# Patient Record
Sex: MALE | Race: WHITE | NOT HISPANIC OR LATINO | URBAN - NONMETROPOLITAN AREA
[De-identification: names, ages, dates, MRNs, and addresses within clinical notes are randomized per-mention and may not be internally consistent; named-entity substitution may affect disease eponyms.]

---

## 2020-08-11 ENCOUNTER — IMPORTED ENCOUNTER (OUTPATIENT)
Dept: URBAN - NONMETROPOLITAN AREA CLINIC 1 | Facility: CLINIC | Age: 70
End: 2020-08-11

## 2020-08-11 PROBLEM — H25.13: Noted: 2020-08-11

## 2020-08-11 PROCEDURE — 92004 COMPRE OPH EXAM NEW PT 1/>: CPT

## 2020-08-11 PROCEDURE — 92015 DETERMINE REFRACTIVE STATE: CPT

## 2020-08-11 NOTE — PATIENT DISCUSSION
Cataract(s)-Visually significant.-Cataract(s) causing symptomatic impairment of visual function not correctable with a tolerable change in glasses or contact lenses lighting or non-operative means resulting in specific activity limitations and/or participation restrictions including but not limited to reading viewing television driving or meeting vocational or recreational needs. -Expectation is clearer vision and reduced glare disability after cataract removal.-Refer to Dr Margie Laguerre for cataract evaluationpt to call when readys/p lasik oumonitor

## 2020-12-11 ENCOUNTER — IMPORTED ENCOUNTER (OUTPATIENT)
Dept: URBAN - NONMETROPOLITAN AREA CLINIC 1 | Facility: CLINIC | Age: 70
End: 2020-12-11

## 2020-12-11 PROCEDURE — 92014 COMPRE OPH EXAM EST PT 1/>: CPT

## 2020-12-11 NOTE — PATIENT DISCUSSION
Cataract(s)-Visually significant cataract OU .-Cataract(s) causing symptomatic impairment of visual function not correctable with a tolerable change in glasses or contact lenses lighting or non-operative means resulting in specific activity limitations and/or participation restrictions including but not limited to reading viewing television driving or meeting vocational or recreational needs. -Expectation is clearer vision and functional improvement in symptoms as well as reduced glare disability after cataract removal.-Order IOLMaster and OPD today. -Recommend LenSx/ LRI   based on today's OPD testing and lifestyle questionnaire.-All questions were answered regarding surgery including pre and post-op medications appointments activity restrictions and anesthetic usage.-The risks benefits and alternatives and special risk factors for the patient were discussed in detail including but not limited to: bleeding infection retinal detachment vitreous loss problems with the implant and possible need for additional surgery.-Although rare the possibility of complete vision loss was discussed.-The possible need for glasses post-operatively was discussed.-Order medical clearance exam based on history of HBP and heart disease -Patient elects to proceed with cataract surgery OS . Will schedule at patient's convenience and re-evaluate OD  in the future. Doctors Recommendations Hx of Hyperopic Lasik OU discussed this with patient and the irregular astigmatism he now has as a result and realistic vision outcome with guarded prognosis. explained to patient the reason for all testing  and our process to ensure he has the best possible vision outcome. patient is a perfectionist and expects to have perfect vision but stressed to him i can not guarantee him perfect vision. Patient refused pre  before seeing me today. Possible need for mild rx or otc readers was explained. Discussed LenSx vs Trad with patient elects to proceed with LenSx. Post op inflammation anticipated discussed Dextenza insertion after surgery.

## 2020-12-30 PROBLEM — H25.13: Noted: 2020-12-30

## 2021-01-06 ENCOUNTER — IMPORTED ENCOUNTER (OUTPATIENT)
Dept: URBAN - NONMETROPOLITAN AREA CLINIC 1 | Facility: CLINIC | Age: 71
End: 2021-01-06

## 2021-01-06 PROBLEM — I10: Noted: 2021-01-06

## 2021-01-06 PROBLEM — J44.9: Noted: 2021-01-06

## 2021-01-06 PROBLEM — H25.13: Noted: 2021-01-06

## 2021-01-06 PROBLEM — Z01.818: Noted: 2021-01-06

## 2021-01-29 ENCOUNTER — IMPORTED ENCOUNTER (OUTPATIENT)
Dept: URBAN - NONMETROPOLITAN AREA CLINIC 1 | Facility: CLINIC | Age: 71
End: 2021-01-29

## 2021-01-29 PROBLEM — Z98.42: Noted: 2021-01-29

## 2021-01-29 PROBLEM — H25.13: Noted: 2021-01-29

## 2021-01-29 PROBLEM — I10: Noted: 2021-01-29

## 2021-01-29 PROBLEM — Z01.818: Noted: 2021-01-29

## 2021-01-29 PROBLEM — J44.9: Noted: 2021-01-29

## 2021-01-29 PROCEDURE — 99024 POSTOP FOLLOW-UP VISIT: CPT

## 2021-01-29 NOTE — PATIENT DISCUSSION
s/p PCIOL LRI/LenSx IOL OS 1/28/21 JS-Pt doing well s/p PCIOL. -Continue post-op gtts according to instruction sheet and sleep with eye shield over eye for 7 nights.-Avoid bending at the waist lifting anything over 5lbs and dirty or andrei environments. -RTC .

## 2021-02-04 ENCOUNTER — IMPORTED ENCOUNTER (OUTPATIENT)
Dept: URBAN - NONMETROPOLITAN AREA CLINIC 1 | Facility: CLINIC | Age: 71
End: 2021-02-04

## 2021-02-04 PROBLEM — Z01.818: Noted: 2021-02-04

## 2021-02-04 PROBLEM — J44.9: Noted: 2021-02-04

## 2021-02-04 PROBLEM — Z98.42: Noted: 2021-01-29

## 2021-02-04 PROBLEM — H25.13: Noted: 2021-02-04

## 2021-02-04 PROBLEM — I10: Noted: 2021-02-04

## 2021-02-04 PROCEDURE — 99024 POSTOP FOLLOW-UP VISIT: CPT

## 2021-02-09 ENCOUNTER — IMPORTED ENCOUNTER (OUTPATIENT)
Dept: URBAN - NONMETROPOLITAN AREA CLINIC 1 | Facility: CLINIC | Age: 71
End: 2021-02-09

## 2021-02-09 PROBLEM — Z98.41: Noted: 2021-02-09

## 2021-02-09 PROCEDURE — 99024 POSTOP FOLLOW-UP VISIT: CPT

## 2021-02-09 NOTE — PATIENT DISCUSSION
s/p PCIOL-Pt doing well s/p PCIOL. -Continue post-op gtts according to instruction sheet and sleep with eye shield over eye for 7 nights.-Avoid bending at the waist lifting anything over 5lbs and dirty or andrei environments. -RTC .

## 2021-02-15 ENCOUNTER — IMPORTED ENCOUNTER (OUTPATIENT)
Dept: URBAN - NONMETROPOLITAN AREA CLINIC 1 | Facility: CLINIC | Age: 71
End: 2021-02-15

## 2021-02-15 PROCEDURE — 99024 POSTOP FOLLOW-UP VISIT: CPT

## 2021-06-14 ENCOUNTER — IMPORTED ENCOUNTER (OUTPATIENT)
Dept: URBAN - NONMETROPOLITAN AREA CLINIC 1 | Facility: CLINIC | Age: 71
End: 2021-06-14

## 2021-06-14 PROBLEM — H35.363: Noted: 2021-06-14

## 2021-06-14 PROBLEM — Z96.1: Noted: 2021-06-14

## 2021-06-14 PROCEDURE — 92014 COMPRE OPH EXAM EST PT 1/>: CPT

## 2022-04-15 ASSESSMENT — VISUAL ACUITY
OD_CC: 20/25-1
OD_PAM: 20/25
OD_CC: 20/40
OS_CC: 20/50
OS_AM: 20/25
OD_CC: 20/50
OD_GLARE: 20/70
OD_PH: 20/40
OS_CC: 20/50
OS_GLARE: 20/70
OS_CC: 20/25
OD_CC: 20/60
OS_CC: 20/25-1
OS_PH: 20/50
OD_SC: J1
OS_CC: 20/25-2
OS_SC: J1
OD_GLARE: 20/200
OD_CC: 20/40-
OS_PH: 20/40
OD_CC: 20/25
OD_PAM: 20/25
OD_SC: J5
OS_CC: 20/80
OS_SC: J5

## 2022-04-15 ASSESSMENT — TONOMETRY
OS_IOP_MMHG: 18
OS_IOP_MMHG: 18
OD_IOP_MMHG: 14
OD_IOP_MMHG: 19
OD_IOP_MMHG: 14
OD_IOP_MMHG: 18
OS_IOP_MMHG: 14
OS_IOP_MMHG: 14
OS_IOP_MMHG: 17
OD_IOP_MMHG: 18
OD_IOP_MMHG: 17